# Patient Record
Sex: MALE | Race: WHITE
[De-identification: names, ages, dates, MRNs, and addresses within clinical notes are randomized per-mention and may not be internally consistent; named-entity substitution may affect disease eponyms.]

---

## 2017-01-04 ENCOUNTER — HOSPITAL ENCOUNTER (OUTPATIENT)
Dept: HOSPITAL 74 - JASU-SURG | Age: 77
Discharge: HOME | End: 2017-01-04
Attending: ORTHOPAEDIC SURGERY
Payer: COMMERCIAL

## 2017-01-04 VITALS — TEMPERATURE: 97.8 F

## 2017-01-04 VITALS — HEART RATE: 55 BPM

## 2017-01-04 VITALS — BODY MASS INDEX: 27.2 KG/M2

## 2017-01-04 VITALS — SYSTOLIC BLOOD PRESSURE: 116 MMHG | DIASTOLIC BLOOD PRESSURE: 71 MMHG

## 2017-01-04 DIAGNOSIS — M75.102: ICD-10-CM

## 2017-01-04 DIAGNOSIS — M65.812: ICD-10-CM

## 2017-01-04 DIAGNOSIS — M75.42: Primary | ICD-10-CM

## 2017-01-04 LAB
APPEARANCE UR: CLEAR
BILIRUB UR STRIP.AUTO-MCNC: NEGATIVE MG/DL
COLOR UR: YELLOW
KETONES UR QL STRIP: NEGATIVE
LEUKOCYTE ESTERASE UR QL STRIP.AUTO: NEGATIVE
MUCOUS THREADS URNS QL MICRO: (no result)
NITRITE UR QL STRIP: NEGATIVE
PH UR: 5 [PH] (ref 5–8)
PROT UR QL STRIP: NEGATIVE
PROT UR QL STRIP: NEGATIVE
RBC # BLD AUTO: <1 /HPF (ref 0–3)
RBC # UR STRIP: (no result) /UL
SP GR UR: 1.02 (ref 1–1.03)
UROBILINOGEN UR STRIP-MCNC: NEGATIVE E.U./DL (ref 0.2–1)
WBC # UR AUTO: <1 /HPF (ref 3–5)

## 2017-01-04 PROCEDURE — 0RBK4ZZ EXCISION OF LEFT SHOULDER JOINT, PERCUTANEOUS ENDOSCOPIC APPROACH: ICD-10-PCS | Performed by: ORTHOPAEDIC SURGERY

## 2017-01-04 PROCEDURE — 0LQ20ZZ REPAIR LEFT SHOULDER TENDON, OPEN APPROACH: ICD-10-PCS | Performed by: ORTHOPAEDIC SURGERY

## 2017-01-04 NOTE — OP
Operative Note





- Note:


Operative Date: 01/04/17


Pre-Operative Diagnosis: left shoulder RTC tear, subacromial impingement


Operation: left shoulder arthroscopy, subacromial decompression, mini open RTC 

repair


Implants: Arthrex Speed Bridge anchors, fiber wire


Surgeon: Arnold Benoit


Assistant: Maulik Solo


Anesthesiologist/CRNA: Rocael Ross Jr.


Anesthesia: General, Local


Estimated Blood Loss (mls): 50


Blood Volume Replaced (mls): 0


Fluid Volume Replaced (mls): 1,000


Operative Report Dictated: Yes

## 2017-01-04 NOTE — OP
DATE OF OPERATION:  1/04/2017 

 

PREOPERATIVE DIAGNOSIS:  Left shoulder rotator cuff tear and subacromial impingement.

 

 

POSTOPERATIVE DIAGNOSIS:  Left shoulder rotator cuff tear and subacromial

impingement.  

 

PROCEDURE:  Left shoulder arthroscopy, subacromial decompression, and mini open

rotator cuff repair, and arthroscopic synovectomy.

 

SURGEON:  Arnold Benoit MD

 

ASSISTANT:  LIZETTE Walker; Rocael Ross CRNA; and Danish Lewis MD

 

ANESTHESIA:  Left interscalene block and LMA anesthesia.  

 

DRAINS:  None.

 

COMPLICATIONS:  None.

 

BLOOD LOSS:  Minimal.

 

BLOOD GIVEN:  None.

 

FLUID REPLACEMENT:  1000 mL Plasmalyte.  

 

DESCRIPTION OF PROCEDURE:  This patient is a 77-year-old male with preoperative

diagnosis of a large acute rotator cuff tear and bony subacromial impingement.  After

understanding the potential risks, complications, alternatives, and benefits to

surgery versus nonsurgical treatment, the patient elected to undergo this procedure. 

The patient went to the operating room.  Peripheral IV placed, IV sedation given. 

Ancef 1 g was given IV and left interscalene block was performed.  He was placed into

the beach chair position with ample padding throughout.  The left upper extremity was

prepped and draped in sterile fashion.  Bony landmarks were marked out with a marking

pen and posterior portal established.  A diagnostic glenohumeral arthroscopy was

performed. 

 

The patient was seen to have a completely torn biceps tendon, therefore an anterior

portal was established, green cannula introduced the joint, and a shaver was used to

debride the biceps tendon.  The patient's labrum looked good.  There was mild osseous

_____ in the glenoid.  The humeral head looked good.  The patient had a complete

rotator cuff tear, a lot of intraarticular synovitis which was debrided from the

glenohumeral side.  

 

Next our attention was turned to the subacromial space.  The patient had a tremendous

amount of inflammatory bursitis.  A lateral portal was established under direct

visualization with a spinal needle.  A No. 15 scalpel blade and a green cannula were

introduced into the joint.  Extensive debridement and soft tissue bursectomy were

performed with the Arthrex wand.  This revealed a large subacromial bony spur.  This

was taken down with a 5.5-mm oval bur     and the shaver.  

 

Next our attention was turned to the rotator cuff.  The patient was seen to have a

large complete rotator cuff tear that required some debridement as well.  A rasp was

introduced into the joint to mildly decorticate the landing pad for the rotator cuff.

 Lateral bursectomy, subdeltoid bursectomy were performed.  Under direct

visualization, 3 FiberWire retraction sutures were placed with the Scorpio needle

passer.  

 

Next, the arthroscopy equipment was removed.  The lateral portal was extended with a

No. 15 scalpel blade.  Subcutaneous hemostasis was achieved with Bovie cautery.  The

Gelpi and Daniela retractors were placed into the wound.  Additional bursectomy was

performed.  We were able to directly visualize the rotator cuff.  It was further

mobilized with a periosteal elevator.  It was overall quite a large tear.  It

retracted to the medial edge of the glenoid and was about the size of a silver

dollar.  We were able to establish the anterior and posterior margins, and then we

put in the 2 medial row bone anchors of the SpeedBridge.  We then, using the Scorpio

needle passer, passed through the FiberTape through the rotator cuff.  Next we

brought 2 tails through another suture anchor and put it through a more distal

anterior aspect of the humerus and then the other 2 sutures using a more distal

posterior aspect of the humerus.  Overall, it came down quite nicely.  The rotator

cuff was well approximated to proximal humerus, moved as a unit with the humerus. 

The area was copiously irrigated and washed out. 

 

Closure was done with 0 Vicryl to reapproximate the deltoid fascia.  Vicryl 2-0 was

used in the deep dermal layer.  Final skin reapproximation was done with a running

subcuticular 3-0 V-Loc.  It was then covered with Steri-Strips.  The anterior and

posterior portals were closed with 3-0 nylon.  Aquacel dressing was applied.  The

patient was placed into a shoulder immobilizer.  He was extubated, brought down out

of the beach chair position.  He was stable throughout the case.  There were no

complications.  Blood loss was minimal, and he was brought to ambulatory recovery in

stable condition.  Total operative time was about 1 hour.  

 

 

KATERIN [id number]

DD: 01/04/2017 11:44

DT: 01/04/2017 12:51

Job #:  [id number]

## 2017-01-04 NOTE — HP
Satellite [facility]





- Chief Complaint


Chief Complaint: left shoulder pain





- Past Medical History


Allergies/Adverse Reactions: 


 Allergies











Allergy/AdvReac Type Severity Reaction Status Date / Time


 


No Known Drug Allergies Allergy   Verified 12/28/16 15:26














- Current Medications


Current Medications: 


 











 Medication  Instructions  Recorded


 


Aspirin [Aspirin EC] 81 mg PO DAILY 12/28/16


 


Ibuprofen 600 mg PO TID PRN 12/28/16


 


Omeprazole 40 mg PO DAILY 12/28/16


 


Simvastatin 20 mg PO DAILY 12/28/16


 


Hydrocodone/Acetaminophen 1 each PO Q6H #40 tablet MDD 4 01/04/17





[Hydrocodon-Acetaminoph 7.5-325]  














Satellite Physical Exam





- Physical Examination


Vital Signs: 


 Vital Signs











 Period  Temp  Pulse  Resp  BP Sys/[place]  Pulse Ox


 


 Last 24 Hr  97.5 F  60  20  137/77  96











General Appearance: Well Nourished, Well Developed, Alert & Oriented x3


ENT: Clear


Lung: Normal air movement


Heart: Regular rate & rhythm


Extremities: Other (left shoulder- +ttp, decr rom, +empty can, nvi MRI + rct)


Neurological: Intact, Alert, Oriented





Satellite Impression/Plan





- Impression/Plan


Impression: left shoulder rct


Operative Procedure: left shoulder arthroscopy rcr, sad


Date to be Performed: 01/04/17

## 2017-01-06 NOTE — PATH
Surgical Pathology Report



Patient Name:  KEATON TORRES

Accession #:  [id number]

Med. Rec. #:  [id number]                                                        

   /Age/Gender:  1940 (Age: 77) / M

Account:  [id number]                                                          

             Location: [facility] SURGICAL

Taken:  2017

Received:  2017

Reported:  2017

Physicians:  Arnold Benoit M.D.

  



Specimen(s) Received

 SHAVINGS LEFT SHOULDER 





Clinical History

Tear left shoulder







Final Diagnosis

SOFT TISSUE, LEFT SHOULDER, ARTHROSCOPIC SHAVINGS:

SYNOVIUM AND FIBROCARTILAGE WITH MYXOHYALINE DEGENERATION.

FRAGMENTS OF UNREMARKABLE BONE AND SKELETAL MUSCLE.





***Electronically Signed***

Alexander Finkelstein, M.D.





Gross Description

Received in formalin, labeled "left shoulder shavings" is a 5.5 x 3.5 x 0.7 cm

aggregate of tan-yellow soft tissue fragments. A representative portion is

submitted in one cassette.

/2017

## 2022-05-21 ENCOUNTER — HOSPITAL ENCOUNTER (EMERGENCY)
Dept: HOSPITAL 74 - JERFT | Age: 82
Discharge: HOME | End: 2022-05-21
Payer: COMMERCIAL

## 2022-05-21 VITALS — TEMPERATURE: 98.3 F | SYSTOLIC BLOOD PRESSURE: 143 MMHG | DIASTOLIC BLOOD PRESSURE: 100 MMHG | HEART RATE: 83 BPM

## 2022-05-21 VITALS — BODY MASS INDEX: 27.3 KG/M2

## 2022-05-21 DIAGNOSIS — S01.419A: Primary | ICD-10-CM

## 2022-05-21 PROCEDURE — 3E0234Z INTRODUCTION OF SERUM, TOXOID AND VACCINE INTO MUSCLE, PERCUTANEOUS APPROACH: ICD-10-PCS

## 2022-05-21 PROCEDURE — 0JQ10ZZ REPAIR FACE SUBCUTANEOUS TISSUE AND FASCIA, OPEN APPROACH: ICD-10-PCS
